# Patient Record
Sex: FEMALE | Race: WHITE | Employment: UNEMPLOYED | URBAN - NONMETROPOLITAN AREA
[De-identification: names, ages, dates, MRNs, and addresses within clinical notes are randomized per-mention and may not be internally consistent; named-entity substitution may affect disease eponyms.]

---

## 2021-01-07 ENCOUNTER — ANCILLARY PROCEDURE (OUTPATIENT)
Dept: MAMMOGRAPHY | Facility: OTHER | Age: 61
End: 2021-01-07
Attending: STUDENT IN AN ORGANIZED HEALTH CARE EDUCATION/TRAINING PROGRAM
Payer: COMMERCIAL

## 2021-01-07 DIAGNOSIS — Z12.31 VISIT FOR SCREENING MAMMOGRAM: ICD-10-CM

## 2021-01-07 PROCEDURE — 77067 SCR MAMMO BI INCL CAD: CPT | Mod: TC | Performed by: RADIOLOGY

## 2021-01-07 PROCEDURE — 77063 BREAST TOMOSYNTHESIS BI: CPT | Mod: TC | Performed by: RADIOLOGY

## 2022-02-03 ENCOUNTER — TRANSFERRED RECORDS (OUTPATIENT)
Dept: HEALTH INFORMATION MANAGEMENT | Facility: CLINIC | Age: 62
End: 2022-02-03

## 2022-02-08 ENCOUNTER — TELEPHONE (OUTPATIENT)
Dept: OTOLARYNGOLOGY | Facility: OTHER | Age: 62
End: 2022-02-08
Payer: COMMERCIAL

## 2022-02-09 ENCOUNTER — TELEPHONE (OUTPATIENT)
Dept: OTOLARYNGOLOGY | Facility: OTHER | Age: 62
End: 2022-02-09
Payer: COMMERCIAL

## 2022-02-09 NOTE — TELEPHONE ENCOUNTER
2/9/22  9:47am    Referral received from [facility]. Called patient to schedule appointment with ENT Dept. Patient stated she is having memory issues and wants to confirm she doesn't have an ENT appointment elsewhere before she schedules with [facility]. Patient will call us back, phone number provided to patient.    Tammie HAMILTON  [facility]  EXT [id number]

## 2022-02-16 ENCOUNTER — OFFICE VISIT (OUTPATIENT)
Dept: OTOLARYNGOLOGY | Facility: OTHER | Age: 62
End: 2022-02-16
Attending: NURSE PRACTITIONER
Payer: COMMERCIAL

## 2022-02-16 VITALS
HEIGHT: 69 IN | OXYGEN SATURATION: 98 % | HEART RATE: 63 BPM | DIASTOLIC BLOOD PRESSURE: 82 MMHG | BODY MASS INDEX: 23.99 KG/M2 | SYSTOLIC BLOOD PRESSURE: 138 MMHG | TEMPERATURE: 97 F | WEIGHT: 162 LBS

## 2022-02-16 DIAGNOSIS — R41.3 MEMORY DIFFICULTIES: ICD-10-CM

## 2022-02-16 DIAGNOSIS — G43.109 COMPLICATED MIGRAINE: ICD-10-CM

## 2022-02-16 DIAGNOSIS — J01.91 ACUTE RECURRENT SINUSITIS, UNSPECIFIED LOCATION: Primary | ICD-10-CM

## 2022-02-16 DIAGNOSIS — J32.9 CHRONIC SINUSITIS, UNSPECIFIED LOCATION: ICD-10-CM

## 2022-02-16 DIAGNOSIS — H91.93 DECREASED HEARING OF BOTH EARS: Primary | ICD-10-CM

## 2022-02-16 PROBLEM — F41.9 ANXIETY: Status: ACTIVE | Noted: 2022-02-16

## 2022-02-16 PROBLEM — R07.89 ATYPICAL CHEST PAIN: Status: ACTIVE | Noted: 2020-01-27

## 2022-02-16 PROCEDURE — 31575 DIAGNOSTIC LARYNGOSCOPY: CPT | Performed by: NURSE PRACTITIONER

## 2022-02-16 PROCEDURE — 99214 OFFICE O/P EST MOD 30 MIN: CPT | Mod: 25 | Performed by: NURSE PRACTITIONER

## 2022-02-16 RX ORDER — POLYETHYLENE GLYCOL 3350 17 G/17G
17 POWDER, FOR SOLUTION ORAL PRN
COMMUNITY

## 2022-02-16 RX ORDER — BISACODYL 5 MG/1
10 TABLET, DELAYED RELEASE ORAL PRN
COMMUNITY

## 2022-02-16 RX ORDER — SENNOSIDES AND DOCUSATE SODIUM 8.6; 5 MG/1; MG/1
2 TABLET ORAL PRN
COMMUNITY
Start: 2021-11-30

## 2022-02-16 RX ORDER — CYCLOBENZAPRINE HCL 10 MG
1 TABLET ORAL EVERY 8 HOURS PRN
COMMUNITY
Start: 2021-10-27

## 2022-02-16 RX ORDER — NITROGLYCERIN 0.4 MG/1
0.4 TABLET SUBLINGUAL PRN
COMMUNITY
Start: 2020-01-27 | End: 2022-03-28

## 2022-02-16 RX ORDER — LEVOCARNITINE TARTRATE 250 MG
250 CAPSULE ORAL DAILY
COMMUNITY

## 2022-02-16 RX ORDER — ASCORBIC ACID 1000 MG
10 TABLET ORAL DAILY
COMMUNITY

## 2022-02-16 RX ORDER — ALBUTEROL SULFATE 0.83 MG/ML
2.5 SOLUTION RESPIRATORY (INHALATION) PRN
COMMUNITY
Start: 2020-02-05

## 2022-02-16 RX ORDER — CHLORAL HYDRATE 500 MG
2 CAPSULE ORAL DAILY
COMMUNITY

## 2022-02-16 RX ORDER — NITROFURANTOIN 25; 75 MG/1; MG/1
1 CAPSULE ORAL EVERY 12 HOURS PRN
COMMUNITY
Start: 2021-08-31 | End: 2022-03-28

## 2022-02-16 RX ORDER — ROSUVASTATIN CALCIUM 5 MG/1
10 TABLET, COATED ORAL DAILY
COMMUNITY
Start: 2020-01-06

## 2022-02-16 ASSESSMENT — PAIN SCALES - GENERAL: PAINLEVEL: NO PAIN (0)

## 2022-02-16 NOTE — PROGRESS NOTES
"Otolaryngology Note         Chief Complaint:     Patient presents with:  Sinusitis: Deviated Nasal Septum Chronic Maxillary Sinusitis            History of Present Illness:     Paulina Roberts is a 61 year old female seen today for concerns for recurrent sinusitis, recurrent salivary duct infections and recurrent OM    She uses heat packs and \"purges\" her submandibular salivary glands and takes zinc as needed.  She feels that she gets infections about 2 times per year.  She is home treating this about 8 time per year in addition to the treatment with abx.  No concerns for dental infections.  She has good oral hygiene, drinks adequate water, eats healthy.      She self reports that on her previous brain MRI she was told that her sinuses were \"curly q\" instead of normal, she feels this causes her to retain irrigation fluid in her nasal passages after rinsing with alexis med sinus rinse.  MRI report or films are not available for review today but will be requested    She has concerns for forgetfulness, memory loss, and times that she gets confused. She reports a history of optic migraines in the distant past.  She also reports that when she has gotten migraines in the past she can find herself getting lost and forget where she is.  Reports this has not happened in many years.      MRI Brain without contrast completed on 11/30/2021 at [facility]:  FINDINGS:  CSF spaces: Ventricles are moderately prominent diffusely and in proportion to sulci. Moderate volume loss in the cerebellum. Incidental note is made of a cavum septum pellucidum.    Parenchyma: Very minimal nonspecific white matter disease is not unusual for patient's age. No mass lesion, midline shift, or mass effect.    Orbits: The visualized portions of the orbits are unremarkable.    Auditory complex: The visualized external and internal auditory canals, middle and inner ears are normal in appearance. The mastoid air cells have minimal fluid more so on the right but " this is improved compared to the previous.    Sinuses: Mild paranasal sinus mucosal disease.     Skull and scalp: No focal suspicious marrow replacing lesion. No suspicious scalp abnormality.    IMPRESSION: Chronic nonspecific white matter changes. Minimal paranasal sinus mucosal disease. Minimal fluid in the mastoid air cells on the right improved compared to the previous.  She reports that when her sinuses act up she gets more forgetful.     She feels left sided sinus pressure and post nasal drainage.      She reports about a month ago she was having a bad day with feeling forgetful, disorganized.  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  She reports she felt overwhelmed, confused and was not thinking clearly.  Being outside in the cold air helped, she felt this helped clear her sinuses so this made her feel better, she then reported that being outside away from the chaos may have helped clear her mind, therefore she felt better.      She able to breath through nose.  She reports is able to smell and taste.  Hyper sense of smell  Also feels hyper sensitive hearing  + layla PND.    No dysphagia. but feels that she has thick PND down throat with globus sensation.    She feels frontal headache today, reports as a pressure.   No fevers or chills.    Appetite is good.     No excessive fatigue recently but has fibromyalgia   No chronic cough  There is tenderness in the throat  No shortness of breath      She has recurrent chronic facial pain and pressure with PND, rarely runs a fever.  Typically treats at home.      In reviewing her records further, she has consulted with [facility] ENT and allergy in December 2020, per records the impression was that she was experiencing atypical migraines with confusion.       No history of sinus injury/trauma/surgery  + history of COM as adult, over the past 10 years.  No audiogram on file.  No concerns for hearing.   ocasional tinnitus.  No otalgia otorrhea.   + history of seasonal allergies, questionable end of summer           Medications:     Current Outpatient Rx   Medication Sig Dispense Refill     albuterol (PROVENTIL) (2.5 MG/3ML) 0.083% neb solution Inhale 2.5 mg into the lungs as needed       ASMANEX, 120 METERED DOSES, 220 MCG/INH inhaler Inhale 1 puff into the lungs every 6 hours as needed       ASPIRIN 81 PO Take 1 tablet by mouth daily       bisacodyl (DULCOLAX) 5 MG EC tablet Take 10 mg by mouth as needed       cholecalciferol (VITAMIN D3) 25 mcg (1000 units) capsule Take 25 mcg by mouth daily       Coenzyme Q10 (CO Q 10) 10 MG CAPS Take 10 mg by mouth daily       cyclobenzaprine (FLEXERIL) 10 MG tablet Take 1 tablet by mouth every 8 hours as needed       fish oil-omega-3 fatty acids 1000 MG capsule Take 2 g by mouth daily       levOCARNitine (L-CARNITINE) 250 MG capsule Take 250 mg by mouth daily       Multiple Vitamin (MULTIVITAMIN ADULT PO) Take 1 tablet by mouth daily       nitroFURantoin macrocrystal-monohydrate (MACROBID) 100 MG capsule Take 1 capsule by mouth every 12 hours as needed       nitroGLYcerin (NITROSTAT) 0.4 MG sublingual tablet Place 0.4 mg under the tongue as needed       polyethylene glycol (MIRALAX) 17 GM/Dose powder Take 17 g by mouth as needed       rosuvastatin (CRESTOR) 5 MG tablet Take 10 mg by mouth daily        SENNA-PLUS 8.6-50 MG tablet Take 2 tablets by mouth as needed       TURMERIC PO Take 1 tablet by mouth daily       vitamin B-12 (CYANOCOBALAMIN) 1000 MCG CR tablet Take 1 tablet by mouth daily              Allergies:     Allergies: Amoxicillin, Augmentin, Sulfa drugs, Doxycycline, and Nicotiana tabacum          Past Medical History:     History reviewed. No pertinent past medical history.         Past Surgical History:     History reviewed. No pertinent surgical history.    ENT family history reviewed         Social History:     Social History     Tobacco Use     Smoking status:  "Never Smoker     Smokeless tobacco: Never Used   Substance Use Topics     Alcohol use: None     Drug use: None            Review of Systems:     ROS: See HPI         Physical Exam:     /82 (Cuff Size: Adult Regular)   Pulse 63   Temp 97  F (36.1  C) (Tympanic)   Ht 1.74 m (5' 8.5\")   Wt 73.5 kg (162 lb)   SpO2 98%   BMI 24.27 kg/m      General - The patient is well nourished and well developed, and appears to have good nutritional status.  Alert and oriented to person and place, answers questions and cooperates with examination appropriately.   Head and Face - Normocephalic and atraumatic, with no gross asymmetry noted.  The facial nerve is intact, with strong symmetric movements.  Voice and Breathing - The patient was breathing comfortably without the use of accessory muscles. There was no wheezing, stridor, or stertor.  The patients voice was clear and strong, and had appropriate pitch and quality.  Ears -The external auditory canals are patent, the tympanic membranes are intact without effusion, retraction or mass.  Bony landmarks are intact.  Eyes - Extraocular movements intact, sclera were not icteric or injected, conjunctiva were pink and moist.  Mouth - Examination of the oral cavity showed pink, healthy oral mucosa. No lesions or ulcerations noted.  The tongue was mobile and midline, and the dentition were in good condition.    Throat - The walls of the oropharynx were smooth, pink, moist, symmetric, and had no lesions or ulcerations.  The tonsillar pillars and soft palate were symmetric.  The uvula was midline on elevation.   Neck - No worrisome lymphadenopathy.   Palpation of the thyroid was soft and smooth, with no nodules or goiter appreciated.  The trachea was mobile and midline.  Nose - External contour is symmetric, no gross deflection or scars.  The nasal passages are examined with nasal speculum.   Nasal mucosa is pink and moist with no abnormal mucus.  The septum was intact and the " turbinates are normal.   No polyps, masses, or purulence noted on examination.    Attempts at mirror laryngoscopy were not possible due to gag reflex.  Therefore I proceeded with a fiberoptic examination after informed consent.  First I sprayed both sides of the nose with a mixture of lidocaine and neosynephrine.  I then passed the scope through the nasal cavity.       The nasal cavity appears healthy, no polpoid change or purulence.      The nasopharynx was mucosally covered and symmetric, there is minimal clear post nasal drainage in the NP.  The eustachian tube openings were unobstructed.  Going further down I had a clear view of the base of tongue which had normal appearing lingual tonsillar tissue.  The base of tongue was free of lesions, and the vallecula was open.  The epiglottis was smooth and mucosally covered.  The supraglottic larynx was then clearly visualized.  The vocal cords moved smoothly and symmetrically and were pearly white.  The pyriform sinuses were open and without layla mass or pooling of secretions upon valsalva, and the limited view of the postcricoid region did not show any lesions.  The patient tolerated the procedure well.              Assessment and Plan:       ICD-10-CM    1. Acute recurrent sinusitis, unspecified location  J01.91    2. Chronic sinusitis, unspecified location  J32.9 CT Sinus w/o Contrast     CANCELED: CT Sinus with & without Contrast   3. Complicated migraine  G43.109    4. Memory difficulties  R41.3      Discussed at length, her symptoms are not likely consistent with chronic sinusitis.  Sinusitis may be an attributing factor but atypical migraine is much more likely to cause these symptoms.  Recommend continued follow through with Neurology.     Clear drainage from salivary ducts today.  NO evidence of acute sialadenitis.    Ordered CT Sinus scan, someone will call you to schedule     Ordered Audiogram and follow up with Komal Floyd    Budesonide nasal saline  irrigation per instructions:  -Obtain Lenny Med Sinus rinse over the counter.    -Use warm distilled water and 2 packets of the salt solution that comes with the bottle, dissolve in bottle up to the 240 mL irving.  -Add 1 vial of budesonide.  -Irrigate each side of your nose leaning over the sink, using 1/3 to 1/2 the volume of the bottle in each nostril every irrigation.  Irrigate 2 times daily.  -If additional rinses are needed/recommended, you may use the plan Lenny Med Sinus irrigation without the use of added budesonide.     Follow up after audiogram and CT sinus.      Komal Floyd NPIselaC  [facility] ENT

## 2022-02-16 NOTE — LETTER
"    2/16/2022         RE: Paulina Roberts  [address] [address]        Dear Colleague,    Thank you for referring your patient, Paulina Roberts, to the [facility]. Please see a copy of my visit note below.    Otolaryngology Note         Chief Complaint:     Patient presents with:  Sinusitis: Deviated Nasal Septum Chronic Maxillary Sinusitis            History of Present Illness:     Paulina Roberts is a 61 year old female seen today for concerns for recurrent sinusitis, recurrent salivary duct infections and recurrent OM    She uses heat packs and \"purges\" her submandibular salivary glands and takes zinc as needed.  She feels that she gets infections about 2 times per year.  She is home treating this about 8 time per year in addition to the treatment with abx.  No concerns for dental infections.  She has good oral hygiene, drinks adequate water, eats healthy.      She self reports that on her previous brain MRI she was told that her sinuses were \"curly q\" instead of normal, she feels this causes her to retain irrigation fluid in her nasal passages after rinsing with alexis med sinus rinse.  MRI report or films are not available for review today but will be requested    She has concerns for forgetfulness, memory loss, and times that she gets confused. She reports a history of optic migraines in the distant past.  She also reports that when she has gotten migraines in the past she can find herself getting lost and forget where she is.  Reports this has not happened in many years.      MRI Brain without contrast completed on 11/30/2021 at :  FINDINGS:  CSF spaces: Ventricles are moderately prominent diffusely and in proportion to sulci. Moderate volume loss in the cerebellum. Incidental note is made of a cavum septum pellucidum.    Parenchyma: Very minimal nonspecific white matter disease is not unusual for patient's age. No mass lesion, midline shift, or mass " effect.    Orbits: The visualized portions of the orbits are unremarkable.    Auditory complex: The visualized external and internal auditory canals, middle and inner ears are normal in appearance. The mastoid air cells have minimal fluid more so on the right but this is improved compared to the previous.    Sinuses: Mild paranasal sinus mucosal disease.     Skull and scalp: No focal suspicious marrow replacing lesion. No suspicious scalp abnormality.    IMPRESSION: Chronic nonspecific white matter changes. Minimal paranasal sinus mucosal disease. Minimal fluid in the mastoid air cells on the right improved compared to the previous.  She reports that when her sinuses act up she gets more forgetful.     She feels left sided sinus pressure and post nasal drainage.      She reports about a month ago she was having a bad day with feeling forgetful, disorganized.  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  She reports she felt overwhelmed, confused and was not thinking clearly.  Being outside in the cold air helped, she felt this helped clear her sinuses so this made her feel better, she then reported that being outside away from the chaos may have helped clear her mind, therefore she felt better.      She able to breath through nose.  She reports is able to smell and taste.  Hyper sense of smell  Also feels hyper sensitive hearing  + layla PND.    No dysphagia. but feels that she has thick PND down throat with globus sensation.    She feels frontal headache today, reports as a pressure.   No fevers or chills.    Appetite is good.     No excessive fatigue recently but has fibromyalgia   No chronic cough  There is tenderness in the throat  No shortness of breath      She has recurrent chronic facial pain and pressure with PND, rarely runs a fever.  Typically treats at home.      In reviewing her records further, she has consulted with [facility] ENT and allergy in  December 2020, per records the impression was that she was experiencing atypical migraines with confusion.       No history of sinus injury/trauma/surgery  + history of COM as adult, over the past 10 years.  No audiogram on file.  No concerns for hearing.  ocasional tinnitus.  No otalgia otorrhea.   + history of seasonal allergies, questionable end of summer           Medications:     Current Outpatient Rx   Medication Sig Dispense Refill     albuterol (PROVENTIL) (2.5 MG/3ML) 0.083% neb solution Inhale 2.5 mg into the lungs as needed       ASMANEX, 120 METERED DOSES, 220 MCG/INH inhaler Inhale 1 puff into the lungs every 6 hours as needed       ASPIRIN 81 PO Take 1 tablet by mouth daily       bisacodyl (DULCOLAX) 5 MG EC tablet Take 10 mg by mouth as needed       cholecalciferol (VITAMIN D3) 25 mcg (1000 units) capsule Take 25 mcg by mouth daily       Coenzyme Q10 (CO Q 10) 10 MG CAPS Take 10 mg by mouth daily       cyclobenzaprine (FLEXERIL) 10 MG tablet Take 1 tablet by mouth every 8 hours as needed       fish oil-omega-3 fatty acids 1000 MG capsule Take 2 g by mouth daily       levOCARNitine (L-CARNITINE) 250 MG capsule Take 250 mg by mouth daily       Multiple Vitamin (MULTIVITAMIN ADULT PO) Take 1 tablet by mouth daily       nitroFURantoin macrocrystal-monohydrate (MACROBID) 100 MG capsule Take 1 capsule by mouth every 12 hours as needed       nitroGLYcerin (NITROSTAT) 0.4 MG sublingual tablet Place 0.4 mg under the tongue as needed       polyethylene glycol (MIRALAX) 17 GM/Dose powder Take 17 g by mouth as needed       rosuvastatin (CRESTOR) 5 MG tablet Take 10 mg by mouth daily        SENNA-PLUS 8.6-50 MG tablet Take 2 tablets by mouth as needed       TURMERIC PO Take 1 tablet by mouth daily       vitamin B-12 (CYANOCOBALAMIN) 1000 MCG CR tablet Take 1 tablet by mouth daily              Allergies:     Allergies: Amoxicillin, Augmentin, Sulfa drugs, Doxycycline, and Nicotiana tabacum          Past Medical  "History:     History reviewed. No pertinent past medical history.         Past Surgical History:     History reviewed. No pertinent surgical history.    ENT family history reviewed         Social History:     Social History     Tobacco Use     Smoking status: Never Smoker     Smokeless tobacco: Never Used   Substance Use Topics     Alcohol use: None     Drug use: None            Review of Systems:     ROS: See HPI         Physical Exam:     /82 (Cuff Size: Adult Regular)   Pulse 63   Temp 97  F (36.1  C) (Tympanic)   Ht 1.74 m (5' 8.5\")   Wt 73.5 kg (162 lb)   SpO2 98%   BMI 24.27 kg/m      General - The patient is well nourished and well developed, and appears to have good nutritional status.  Alert and oriented to person and place, answers questions and cooperates with examination appropriately.   Head and Face - Normocephalic and atraumatic, with no gross asymmetry noted.  The facial nerve is intact, with strong symmetric movements.  Voice and Breathing - The patient was breathing comfortably without the use of accessory muscles. There was no wheezing, stridor, or stertor.  The patients voice was clear and strong, and had appropriate pitch and quality.  Ears -The external auditory canals are patent, the tympanic membranes are intact without effusion, retraction or mass.  Bony landmarks are intact.  Eyes - Extraocular movements intact, sclera were not icteric or injected, conjunctiva were pink and moist.  Mouth - Examination of the oral cavity showed pink, healthy oral mucosa. No lesions or ulcerations noted.  The tongue was mobile and midline, and the dentition were in good condition.    Throat - The walls of the oropharynx were smooth, pink, moist, symmetric, and had no lesions or ulcerations.  The tonsillar pillars and soft palate were symmetric.  The uvula was midline on elevation.   Neck - No worrisome lymphadenopathy.   Palpation of the thyroid was soft and smooth, with no nodules or goiter " appreciated.  The trachea was mobile and midline.  Nose - External contour is symmetric, no gross deflection or scars.  The nasal passages are examined with nasal speculum.   Nasal mucosa is pink and moist with no abnormal mucus.  The septum was intact and the turbinates are normal.   No polyps, masses, or purulence noted on examination.    Attempts at mirror laryngoscopy were not possible due to gag reflex.  Therefore I proceeded with a fiberoptic examination after informed consent.  First I sprayed both sides of the nose with a mixture of lidocaine and neosynephrine.  I then passed the scope through the nasal cavity.       The nasal cavity appears healthy, no polpoid change or purulence.      The nasopharynx was mucosally covered and symmetric, there is minimal clear post nasal drainage in the NP.  The eustachian tube openings were unobstructed.  Going further down I had a clear view of the base of tongue which had normal appearing lingual tonsillar tissue.  The base of tongue was free of lesions, and the vallecula was open.  The epiglottis was smooth and mucosally covered.  The supraglottic larynx was then clearly visualized.  The vocal cords moved smoothly and symmetrically and were pearly white.  The pyriform sinuses were open and without layla mass or pooling of secretions upon valsalva, and the limited view of the postcricoid region did not show any lesions.  The patient tolerated the procedure well.              Assessment and Plan:       ICD-10-CM    1. Acute recurrent sinusitis, unspecified location  J01.91    2. Chronic sinusitis, unspecified location  J32.9 CT Sinus w/o Contrast     CANCELED: CT Sinus with & without Contrast   3. Complicated migraine  G43.109    4. Memory difficulties  R41.3      Discussed at length, her symptoms are not likely consistent with chronic sinusitis.  Sinusitis may be an attributing factor but atypical migraine is much more likely to cause these symptoms.  Recommend continued  follow through with Neurology.     Clear drainage from salivary ducts today.  NO evidence of acute sialadenitis.    Ordered CT Sinus scan, someone will call you to schedule     Ordered Audiogram and follow up with Komal Floyd    Budesonide nasal saline irrigation per instructions:  -Obtain Lenny Med Sinus rinse over the counter.    -Use warm distilled water and 2 packets of the salt solution that comes with the bottle, dissolve in bottle up to the 240 mL irving.  -Add 1 vial of budesonide.  -Irrigate each side of your nose leaning over the sink, using 1/3 to 1/2 the volume of the bottle in each nostril every irrigation.  Irrigate 2 times daily.  -If additional rinses are needed/recommended, you may use the plan Lenny Med Sinus irrigation without the use of added budesonide.     Follow up after audiogram and CT sinus.      Komal BOWLING  [facility] ENT          Again, thank you for allowing me to participate in the care of your patient.        Sincerely,        Komal Floyd NP

## 2022-02-16 NOTE — NURSING NOTE
"Chief Complaint   Patient presents with     Sinusitis     Deviated Nasal Septum Chronic Maxillary Sinusitis        Initial /82 (Cuff Size: Adult Regular)   Pulse 63   Temp 97  F (36.1  C) (Tympanic)   Ht 1.74 m (5' 8.5\")   Wt 73.5 kg (162 lb)   SpO2 98%   BMI 24.27 kg/m   Estimated body mass index is 24.27 kg/m  as calculated from the following:    Height as of this encounter: 1.74 m (5' 8.5\").    Weight as of this encounter: 73.5 kg (162 lb).  Medication Reconciliation: complete  Rekha Carbajal LPN    "

## 2022-02-16 NOTE — PATIENT INSTRUCTIONS
Thank you for allowing Komal Floyd and our ENT team to participate in your care.  If your medications are too expensive, please give the nurse a call.  We can possibly change this medication.  If you have a scheduling or an appointment question please contact our Health Unit Coordinator at their direct line [phone].   ALL nursing questions or concerns can be directed to your ENT nurse at: [phone] - Radha       Ordered CT Sinus scan, someone will call you to schedule     Ordered Audiogram and follow up with Komal Floyd      Budesonide nasal saline irrigation per instructions:  -Obtain Lenny Med Sinus rinse over the counter.    -Use warm distilled water and 2 packets of the salt solution that comes with the bottle, dissolve in bottle up to the 240 mL irving.  -Add 1 vial of budesonide.  -Irrigate each side of your nose leaning over the sink, using 1/3 to 1/2 the volume of the bottle in each nostril every irrigation.  Irrigate 2 times daily.  -If additional rinses are needed/recommended, you may use the plan Lenny Med Sinus irrigation without the use of added budesonide.

## 2022-02-22 ENCOUNTER — HOSPITAL ENCOUNTER (OUTPATIENT)
Dept: CT IMAGING | Facility: HOSPITAL | Age: 62
Discharge: HOME | End: 2022-02-22
Attending: NURSE PRACTITIONER | Admitting: NURSE PRACTITIONER
Payer: COMMERCIAL

## 2022-02-22 DIAGNOSIS — J32.9 CHRONIC SINUSITIS, UNSPECIFIED LOCATION: ICD-10-CM

## 2022-02-22 PROCEDURE — 70486 CT MAXILLOFACIAL W/O DYE: CPT

## 2022-02-25 NOTE — RESULT ENCOUNTER NOTE
Small amount of fluid in both max sinuses, this may be from rinsing her nose.  There is a left sided septal spur with touchpoint to the IT.  This may cause some nasal obstruction on the left.  No evidence of chronic sinusitis.  JS

## 2022-03-24 PROBLEM — E78.5 DYSLIPIDEMIA: Status: ACTIVE | Noted: 2021-12-20

## 2022-03-28 ENCOUNTER — OFFICE VISIT (OUTPATIENT)
Dept: AUDIOLOGY | Facility: OTHER | Age: 62
End: 2022-03-28
Attending: AUDIOLOGIST
Payer: COMMERCIAL

## 2022-03-28 ENCOUNTER — OFFICE VISIT (OUTPATIENT)
Dept: OTOLARYNGOLOGY | Facility: OTHER | Age: 62
End: 2022-03-28
Attending: AUDIOLOGIST
Payer: COMMERCIAL

## 2022-03-28 VITALS
WEIGHT: 160 LBS | SYSTOLIC BLOOD PRESSURE: 144 MMHG | HEART RATE: 57 BPM | HEIGHT: 69 IN | DIASTOLIC BLOOD PRESSURE: 66 MMHG | BODY MASS INDEX: 23.7 KG/M2 | TEMPERATURE: 96.9 F | OXYGEN SATURATION: 98 %

## 2022-03-28 DIAGNOSIS — M79.7 FIBROMYALGIA: ICD-10-CM

## 2022-03-28 DIAGNOSIS — G43.109 COMPLICATED MIGRAINE: Primary | ICD-10-CM

## 2022-03-28 DIAGNOSIS — H91.93 DECREASED HEARING OF BOTH EARS: ICD-10-CM

## 2022-03-28 DIAGNOSIS — H90.3 SENSORINEURAL HEARING LOSS (SNHL) OF BOTH EARS: Primary | ICD-10-CM

## 2022-03-28 DIAGNOSIS — R41.3 MEMORY DIFFICULTIES: ICD-10-CM

## 2022-03-28 PROCEDURE — 92557 COMPREHENSIVE HEARING TEST: CPT | Performed by: AUDIOLOGIST

## 2022-03-28 PROCEDURE — 99213 OFFICE O/P EST LOW 20 MIN: CPT | Performed by: NURSE PRACTITIONER

## 2022-03-28 PROCEDURE — 92550 TYMPANOMETRY & REFLEX THRESH: CPT | Performed by: AUDIOLOGIST

## 2022-03-28 ASSESSMENT — PAIN SCALES - GENERAL: PAINLEVEL: NO PAIN (0)

## 2022-03-28 NOTE — LETTER
3/28/2022         RE: Paulina Roberts  [address] [facility] [address] [address]        Dear Colleague,    Thank you for referring your patient, Paulina Roberts, to the [facility]. Please see a copy of my visit note below.    Otolaryngology Note         Chief Complaint:     Patient presents with:  RECHECK: Follow up sinus and hearing.           History of Present Illness:     Paulina Roberts is a 61 year old female seen today for follow-up review of CT scan and audiogram.  I last saw Paulina on 2/16/2022 for concerns for recurrent sinusitis.    CT scan of the sinuses was completed on 2/22/2022.  It showed small amount of fluid in both maxillary sinuses worse on the left than the right.  The remainder of the sinuses are clear.  No evidence of chronic sinusitis mucosal thickening or polypoid change.  She does rinse quite frequently and likely the air-fluid level in the maxillary sinuses is from her rinsing.  There is no evidence of acute sinus infection when I saw her on 216.    Audiogram completed today:  Tympanograms are Type A for both ears suggesting normal eardrum mobility.  Acoustic Reflex Thresholds at 1000 Hz are present for both ears.  Thresholds are normal range left with mild loss 8000 Hz and right ear normal range with mild conductive notch at 4000 and mild sensorineural hearing loss at 8000 Hz.  Speech reception thresholds are in good agreement with pure tone average.  Word discrimination scores are excellent at supra-thresholds level.    She reports no recurrent sinusitis at this time.  She feels that her cognition is slightly improved.  Does note that she seems to be more clear in a decreased stress environment.  She continues work-up with neurology for cognitive dysfunction and atypical migraine.    She has not had any recent salivary gland infections.         Medications:     Current Outpatient Rx   Medication Sig Dispense Refill     albuterol (PROVENTIL) (2.5 MG/3ML) 0.083% neb  "solution Inhale 2.5 mg into the lungs as needed       ASMANEX, 120 METERED DOSES, 220 MCG/INH inhaler Inhale 1 puff into the lungs every 6 hours as needed       ASPIRIN 81 PO Take 1 tablet by mouth daily       bisacodyl (DULCOLAX) 5 MG EC tablet Take 10 mg by mouth as needed       cholecalciferol (VITAMIN D3) 25 mcg (1000 units) capsule Take 25 mcg by mouth daily       Coenzyme Q10 (CO Q 10) 10 MG CAPS Take 10 mg by mouth daily       cyclobenzaprine (FLEXERIL) 10 MG tablet Take 1 tablet by mouth every 8 hours as needed       fish oil-omega-3 fatty acids 1000 MG capsule Take 2 g by mouth daily       levOCARNitine (L-CARNITINE) 250 MG capsule Take 250 mg by mouth daily       Multiple Vitamin (MULTIVITAMIN ADULT PO) Take 1 tablet by mouth daily       polyethylene glycol (MIRALAX) 17 GM/Dose powder Take 17 g by mouth as needed       rosuvastatin (CRESTOR) 5 MG tablet Take 10 mg by mouth daily        SENNA-PLUS 8.6-50 MG tablet Take 2 tablets by mouth as needed       TURMERIC PO Take 1 tablet by mouth daily       vitamin B-12 (CYANOCOBALAMIN) 1000 MCG CR tablet Take 1 tablet by mouth daily              Allergies:     Allergies: Amoxicillin, Augmentin, Sulfa drugs, Doxycycline, and Nicotiana tabacum          Past Medical History:     History reviewed. No pertinent past medical history.         Past Surgical History:     History reviewed. No pertinent surgical history.    ENT family history reviewed         Social History:     Social History     Tobacco Use     Smoking status: Never Smoker     Smokeless tobacco: Never Used   Substance Use Topics     Alcohol use: None     Drug use: None            Review of Systems:     ROS: See HPI         Physical Exam:     BP (!) 144/66 (BP Location: Right arm, Cuff Size: Adult Regular)   Pulse 57   Temp 96.9  F (36.1  C) (Tympanic)   Ht 1.74 m (5' 8.5\")   Wt 72.6 kg (160 lb)   SpO2 98%   BMI 23.97 kg/m      General - The patient is well nourished and well developed, and appears to " have good nutritional status.  Alert and oriented to person and place, answers questions and cooperates with examination appropriately.   Head and Face - Normocephalic and atraumatic, with no gross asymmetry noted.  The facial nerve is intact, with strong symmetric movements.  Voice and Breathing - The patient was breathing comfortably without the use of accessory muscles. There was no wheezing, stridor. The patients voice was clear and strong, and had appropriate pitch and quality.  Ears - External ear normal. Canals are patent. Right tympanic membrane is intact without effusion, retraction or mass. Left tympanic membrane is intact without effusion, retraction or mass.  Eyes - Extraocular movements intact, sclera were not icteric or injected.  Mouth - Examination of the oral cavity showed pink, healthy oral mucosa. Dentition in good condition. No lesions or ulcerations noted. The tongue was mobile and midline.  Both Stensen's and [place]'s ducts have clear saliva.  Throat - The walls of the oropharynx were smooth, pink, moist, symmetric, and had no lesions or ulcerations.  The tonsillar pillars and soft palate were symmetric. The uvula was midline on elevation.    Neck -No palpable lymphadenopathy.  Palpation of the thyroid was soft and smooth, with no nodules or goiter appreciated.  The trachea was mobile and midline.  Nose - External contour is symmetric, no gross deflection or scars.  Nasal mucosa is pink and moist with no abnormal mucus.  The septum and turbinates were evaluated with nasal speculum, no polyps, masses, or purulence noted on examination.         Assessment and Plan:       ICD-10-CM    1. Complicated migraine  G43.109    2. Memory difficulties  R41.3    3. Fibromyalgia  M79.7      Recommend continue follow-up with neurology for cognitive dysfunction and atypical migraine.  Consider preventative migraine medication, I will defer to neurology.    Reassured that ears appear healthy.  Hearing is  essentially normal.  No evidence of chronic sinusitis on CT.    Follow-up with ENT as needed.    Komal BOWLING  [facility]NT        Again, thank you for allowing me to participate in the care of your patient.        Sincerely,        Komal Floyd NP

## 2022-03-28 NOTE — PATIENT INSTRUCTIONS
Thank you for allowing Komal Everett and our ENT team to participate in your care.  If your medications are too expensive, please give the nurse a call.  We can possibly change this medication.  If you have a scheduling or an appointment question please contact our Health Unit Coordinator at their direct line [phone] ext [phone].   ALL nursing questions or concerns can be directed to your ENT nurse at: [phone] - Radha    Maintain adequate oral hydration, sialagogues (lemon wedges, lemon drops, sour candies), gland massage.  1/2 strength peroxide oral rinses if dilation performed today.  We discussed theories on stone development, including chronic sialadenitis or stricture leading to stagnant saliva and irritation to surrounding ductal tissue which may lead to stones.    If no improvement, proceed with submandibular gland excision.  This was discussed today.  Follow up as indicated within 1 month.    Follow through with neurology for neurocognitive testing     Consider treatment for atypical migraines     Follow up as needed

## 2022-03-28 NOTE — PROGRESS NOTES
Audiology Evaluation Completed. Please refer SCANNED AUDIOGRAM and/or TYMPANOGRAM for BACKGROUND, RESULTS, RECOMMENDATIONS.      Renetta SEPULVEDA, [facility]  Audiologist #[id number]

## 2022-03-28 NOTE — NURSING NOTE
"Chief Complaint   Patient presents with     RECHECK     Follow up sinus and hearing.       Initial BP (!) 144/66 (BP Location: Right arm, Cuff Size: Adult Regular)   Pulse 57   Temp 96.9  F (36.1  C) (Tympanic)   Ht 1.74 m (5' 8.5\")   Wt 72.6 kg (160 lb)   SpO2 98%   BMI 23.97 kg/m   Estimated body mass index is 23.97 kg/m  as calculated from the following:    Height as of this encounter: 1.74 m (5' 8.5\").    Weight as of this encounter: 72.6 kg (160 lb).  Medication Reconciliation: complete  Julita Guido LPN    "

## 2022-03-30 NOTE — PROGRESS NOTES
Otolaryngology Note         Chief Complaint:     Patient presents with:  RECHECK: Follow up sinus and hearing.           History of Present Illness:     Paulina Roberts is a 61 year old female seen today for follow-up review of CT scan and audiogram.  I last saw Paulina on 2/16/2022 for concerns for recurrent sinusitis.    CT scan of the sinuses was completed on 2/22/2022.  It showed small amount of fluid in both maxillary sinuses worse on the left than the right.  The remainder of the sinuses are clear.  No evidence of chronic sinusitis mucosal thickening or polypoid change.  She does rinse quite frequently and likely the air-fluid level in the maxillary sinuses is from her rinsing.  There is no evidence of acute sinus infection when I saw her on 216.    Audiogram completed today:  Tympanograms are Type A for both ears suggesting normal eardrum mobility.  Acoustic Reflex Thresholds at 1000 Hz are present for both ears.  Thresholds are normal range left with mild loss 8000 Hz and right ear normal range with mild conductive notch at 4000 and mild sensorineural hearing loss at 8000 Hz.  Speech reception thresholds are in good agreement with pure tone average.  Word discrimination scores are excellent at supra-thresholds level.    She reports no recurrent sinusitis at this time.  She feels that her cognition is slightly improved.  Does note that she seems to be more clear in a decreased stress environment.  She continues work-up with neurology for cognitive dysfunction and atypical migraine.    She has not had any recent salivary gland infections.         Medications:     Current Outpatient Rx   Medication Sig Dispense Refill     albuterol (PROVENTIL) (2.5 MG/3ML) 0.083% neb solution Inhale 2.5 mg into the lungs as needed       ASMANEX, 120 METERED DOSES, 220 MCG/INH inhaler Inhale 1 puff into the lungs every 6 hours as needed       ASPIRIN 81 PO Take 1 tablet by mouth daily       bisacodyl (DULCOLAX) 5 MG EC tablet Take  "10 mg by mouth as needed       cholecalciferol (VITAMIN D3) 25 mcg (1000 units) capsule Take 25 mcg by mouth daily       Coenzyme Q10 (CO Q 10) 10 MG CAPS Take 10 mg by mouth daily       cyclobenzaprine (FLEXERIL) 10 MG tablet Take 1 tablet by mouth every 8 hours as needed       fish oil-omega-3 fatty acids 1000 MG capsule Take 2 g by mouth daily       levOCARNitine (L-CARNITINE) 250 MG capsule Take 250 mg by mouth daily       Multiple Vitamin (MULTIVITAMIN ADULT PO) Take 1 tablet by mouth daily       polyethylene glycol (MIRALAX) 17 GM/Dose powder Take 17 g by mouth as needed       rosuvastatin (CRESTOR) 5 MG tablet Take 10 mg by mouth daily        SENNA-PLUS 8.6-50 MG tablet Take 2 tablets by mouth as needed       TURMERIC PO Take 1 tablet by mouth daily       vitamin B-12 (CYANOCOBALAMIN) 1000 MCG CR tablet Take 1 tablet by mouth daily              Allergies:     Allergies: Amoxicillin, Augmentin, Sulfa drugs, Doxycycline, and Nicotiana tabacum          Past Medical History:     History reviewed. No pertinent past medical history.         Past Surgical History:     History reviewed. No pertinent surgical history.    ENT family history reviewed         Social History:     Social History     Tobacco Use     Smoking status: Never Smoker     Smokeless tobacco: Never Used   Substance Use Topics     Alcohol use: None     Drug use: None            Review of Systems:     ROS: See HPI         Physical Exam:     BP (!) 144/66 (BP Location: Right arm, Cuff Size: Adult Regular)   Pulse 57   Temp 96.9  F (36.1  C) (Tympanic)   Ht 1.74 m (5' 8.5\")   Wt 72.6 kg (160 lb)   SpO2 98%   BMI 23.97 kg/m      General - The patient is well nourished and well developed, and appears to have good nutritional status.  Alert and oriented to person and place, answers questions and cooperates with examination appropriately.   Head and Face - Normocephalic and atraumatic, with no gross asymmetry noted.  The facial nerve is intact, with " strong symmetric movements.  Voice and Breathing - The patient was breathing comfortably without the use of accessory muscles. There was no wheezing, stridor. The patients voice was clear and strong, and had appropriate pitch and quality.  Ears - External ear normal. Canals are patent. Right tympanic membrane is intact without effusion, retraction or mass. Left tympanic membrane is intact without effusion, retraction or mass.  Eyes - Extraocular movements intact, sclera were not icteric or injected.  Mouth - Examination of the oral cavity showed pink, healthy oral mucosa. Dentition in good condition. No lesions or ulcerations noted. The tongue was mobile and midline.  Both Stensen's and Graham's ducts have clear saliva.  Throat - The walls of the oropharynx were smooth, pink, moist, symmetric, and had no lesions or ulcerations.  The tonsillar pillars and soft palate were symmetric. The uvula was midline on elevation.    Neck -No palpable lymphadenopathy.  Palpation of the thyroid was soft and smooth, with no nodules or goiter appreciated.  The trachea was mobile and midline.  Nose - External contour is symmetric, no gross deflection or scars.  Nasal mucosa is pink and moist with no abnormal mucus.  The septum and turbinates were evaluated with nasal speculum, no polyps, masses, or purulence noted on examination.         Assessment and Plan:       ICD-10-CM    1. Complicated migraine  G43.109    2. Memory difficulties  R41.3    3. Fibromyalgia  M79.7      Recommend continue follow-up with neurology for cognitive dysfunction and atypical migraine.  Consider preventative migraine medication, I will defer to neurology.    Reassured that ears appear healthy.  Hearing is essentially normal.  No evidence of chronic sinusitis on CT.    Follow-up with ENT as needed.    Komal Floyd NP-C  [facility]

## 2023-12-01 ENCOUNTER — LAB REQUISITION (OUTPATIENT)
Dept: LAB | Facility: CLINIC | Age: 63
End: 2023-12-01
Payer: COMMERCIAL

## 2023-12-01 DIAGNOSIS — D48.5 NEOPLASM OF UNCERTAIN BEHAVIOR OF SKIN: ICD-10-CM

## 2023-12-01 PROCEDURE — 88305 TISSUE EXAM BY PATHOLOGIST: CPT | Mod: TC,ORL | Performed by: STUDENT IN AN ORGANIZED HEALTH CARE EDUCATION/TRAINING PROGRAM

## 2023-12-01 PROCEDURE — 88305 TISSUE EXAM BY PATHOLOGIST: CPT | Mod: 26 | Performed by: DERMATOLOGY

## 2023-12-07 LAB
PATH REPORT.COMMENTS IMP SPEC: NORMAL
PATH REPORT.COMMENTS IMP SPEC: NORMAL
PATH REPORT.FINAL DX SPEC: NORMAL
PATH REPORT.GROSS SPEC: NORMAL
PATH REPORT.MICROSCOPIC SPEC OTHER STN: NORMAL
PATH REPORT.RELEVANT HX SPEC: NORMAL